# Patient Record
Sex: FEMALE | Race: WHITE | HISPANIC OR LATINO
[De-identification: names, ages, dates, MRNs, and addresses within clinical notes are randomized per-mention and may not be internally consistent; named-entity substitution may affect disease eponyms.]

---

## 2024-04-25 PROBLEM — Z00.00 ENCOUNTER FOR PREVENTIVE HEALTH EXAMINATION: Status: ACTIVE | Noted: 2024-04-25

## 2024-04-29 ENCOUNTER — APPOINTMENT (OUTPATIENT)
Dept: ORTHOPEDIC SURGERY | Facility: CLINIC | Age: 39
End: 2024-04-29
Payer: COMMERCIAL

## 2024-04-29 VITALS — WEIGHT: 135 LBS | BODY MASS INDEX: 25.49 KG/M2 | HEIGHT: 61 IN

## 2024-04-29 DIAGNOSIS — R74.8 ABNORMAL LEVELS OF OTHER SERUM ENZYMES: ICD-10-CM

## 2024-04-29 DIAGNOSIS — M76.891 OTHER SPECIFIED ENTHESOPATHIES OF RIGHT LOWER LIMB, EXCLUDING FOOT: ICD-10-CM

## 2024-04-29 DIAGNOSIS — M25.561 PAIN IN RIGHT KNEE: ICD-10-CM

## 2024-04-29 DIAGNOSIS — Z82.61 FAMILY HISTORY OF ARTHRITIS: ICD-10-CM

## 2024-04-29 PROCEDURE — 73562 X-RAY EXAM OF KNEE 3: CPT | Mod: RT

## 2024-04-29 PROCEDURE — 99203 OFFICE O/P NEW LOW 30 MIN: CPT

## 2024-04-29 RX ORDER — NAPROXEN 500 MG/1
500 TABLET ORAL
Qty: 30 | Refills: 0 | Status: ACTIVE | COMMUNITY
Start: 2024-04-29 | End: 1900-01-01

## 2024-04-29 RX ORDER — OMEPRAZOLE 40 MG/1
CAPSULE, DELAYED RELEASE ORAL
Refills: 0 | Status: ACTIVE | COMMUNITY

## 2024-04-30 NOTE — IMAGING
[Right] : right knee [All Views] : anteroposterior, lateral, skyline, and anteroposterior standing [de-identified] : The patient is a well appearing 38 year  old female of their stated age. Patient ambulates with a normal gait. Negative straight leg raise bilateral.    Effected Knee: RIGHT  ROM:  0-140 degrees Lachman: Negative Pivot Shift: Negative Anterior Drawer: Negative Posterior Drawer / Sag: Negative Varus Stress 0 degrees: Stable Varus Stress 30 degrees: Stable Valgus Stress 0 degrees: Stable Valgus Stress 30 degrees: Stable Medial Emily: Negative Lateral Emily: Negative Patella Glide: 2+ Patella Apprehension: Negative Patella Grind: Negative Pes Wilton Valgus: Negative Pes Cavus: Negative   Palpation: Medial Joint Line: Nontender Lateral Joint Line: Nontender Medial Collateral Ligament: Nontender Lateral Collateral Ligament/PLC: Nontender Distal Femur: Nontender Proximal Tibia: Nontender Tibial Tubercle: Nontender Gerdy's Tubercle: Nontender Distal Pole Patella: Nontender Quadriceps Tendon: TENDER &  Intact Patella Tendon: Nontender &  Intact Medial Femoral Condyle: Nontender Medial Distal Hamstring/PES: Nontender Lateral Distal Hamstring: Nontender & Stable Iliotibial Band: Nontender Medial Patellofemoral Ligament: Nontender Adductor: Nontender Proximal GSC-Plantaris: Nontender Calf: Supple & Nontender   Inspection: [facility]  Deformity: No Erythema: No Ecchymosis: No Abrasions: No Effusion: No Prepatella Bursitis: No Neurologic Exam: Sensation L4-S1: Grossly Intact Motor Exam: Quadriceps: 5 out of 5 Hamstrings: 5 out of 5 EHL: 5 out of 5 FHL: 5 out of 5 TA: 5 out of 5 GS: 5 out of 5 Circulatory/Pulses: Dorsalis Pedis: 2+ Posterior Tibialis: 2+ Additional Pertinent Findings: None   Contralateral Knee:                            ROM: 0-145 degrees Other Pertinent Findings: None   Assessment: The patient is a 38 year old female with right knee pain and radiographic and physical exam findings consistent with quadriceps tendonitis.  The patient's condition is acute. Documents/Results Reviewed Today: X-Ray right knee  Tests/Studies Independently Interpreted Today: X-Ray right knee reveals evidence of previous ACL reconstruction with femoral and tibial button in proper anatomical approximation.  Pertinent findings include: 0-140. -LM/PS/AD, tender quadriceps tendon, +pes planovalgus  Confounding medical conditions/concerns: S/p right knee ACL reconstruction 5/2015   Plan: We discussed treatment options for the patient's quadriceps tendonitis being an overuse injury related to high impact activities. The patient will start physical therapy, HEP, and stretching. Advised the patient to obtain a Reparel knee sleeve and topical Voltaren gel to aid in inflammatory process. She will begin use of full length inserts. Prescribed patient Naproxen 500mg BID x 2 weeks, then PRN for pain management and inflammation - use as directed and take with food. Cautiously optimistic that this will resolve through proper rest, rehab, and stretching. The patient was advised to let pain/discomfort be their guide for return to sport specific activity. Modify activity as discussed.  Tests Ordered: None  Prescription Medications Ordered: Naproxen 500mg and topical Voltaren gel  Braces/DME Ordered: Reparel  Activity/Work/Sports Status: None Additional Instructions: Full length shoe inserts  Follow-Up: 4 weeks   The patient's current medication management of their orthopedic diagnosis was reviewed today: (1) We discussed a comprehensive treatment plan that included possible pharmaceutical management involving the use of prescription strength medications including but not limited to options such as oral Naprosyn 500mg BID, once daily Meloxicam 15 mg, or 500-650 mg Tylenol versus over the counter oral medications and topical prescription NSAID Pennsaid vs over the counter Voltaren gel.  Based on our extensive discussion, the patient was prescribed Naprosyn 500mg BID for two weeks.  It will then be used PRN for pain, inflammation and discomfort. (2) There is a moderate risk of morbidity with further treatment, especially from use of prescription strength medications and possible side effects of these medications which include upset stomach with oral medications, skin reactions to topical medications and cardiac/renal issues with long term use. (3) I recommended that the patient follow-up with their medical physician to discuss any significant specific potential issues with long term medication use such as interactions with current medications or with exacerbation of underlying medical comorbidities. (4) The benefits and risks associated with use of injectable, oral or topical, prescription and over the counter anti-inflammatory medications were discussed with the patient. The patient voiced understanding of the risks including but not limited to bleeding, stroke, kidney dysfunction, heart disease, and were referred to the black box warning label for further information.   I, Marysol Gage attest that this documentation has been prepared under the direction and in the presence of Provider Dr. Dimitri Clements.   The documentation recorded by the scribe accurately reflects the services I, Dr. Dimitri Clements, personally performed and the decisions made by me. [FreeTextEntry9] : previous ACL reconstruction with femoral and tibial button in proper anatomical approximation

## 2024-04-30 NOTE — HISTORY OF PRESENT ILLNESS
[de-identified] : The patient is a 38 year  old R hand dominant female who presents today complaining of R knee pain.   Date of Injury/Onset: 3/30/24 Pain:    At Rest: 2/10  With Activity:  7/10  Mechanism of injury: after working out started experiencing pain no specific JUAN DAVID This is NOT a Work Related Injury being treated under Worker's Compensation. This is NOT an athletic injury occurring associated with an interscholastic or organized sports team. 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 Clements in 05/2015

## 2024-05-23 ENCOUNTER — APPOINTMENT (OUTPATIENT)
Dept: ORTHOPEDIC SURGERY | Facility: CLINIC | Age: 39
End: 2024-05-23

## 2024-07-25 ENCOUNTER — APPOINTMENT (OUTPATIENT)
Dept: ORTHOPEDIC SURGERY | Facility: CLINIC | Age: 39
End: 2024-07-25

## 2024-07-30 ENCOUNTER — RESULT REVIEW (OUTPATIENT)
Age: 39
End: 2024-07-30

## 2024-08-01 ENCOUNTER — APPOINTMENT (OUTPATIENT)
Dept: ORTHOPEDIC SURGERY | Facility: CLINIC | Age: 39
End: 2024-08-01

## 2024-08-01 VITALS — WEIGHT: 135 LBS | HEIGHT: 61 IN | BODY MASS INDEX: 25.49 KG/M2

## 2024-08-01 DIAGNOSIS — M77.01 MEDIAL EPICONDYLITIS, RIGHT ELBOW: ICD-10-CM

## 2024-08-01 DIAGNOSIS — M77.02 MEDIAL EPICONDYLITIS, RIGHT ELBOW: ICD-10-CM

## 2024-08-01 DIAGNOSIS — M25.521 PAIN IN RIGHT ELBOW: ICD-10-CM

## 2024-08-01 DIAGNOSIS — M22.2X1 PATELLOFEMORAL DISORDERS, RIGHT KNEE: ICD-10-CM

## 2024-08-01 DIAGNOSIS — M25.522 PAIN IN LEFT ELBOW: ICD-10-CM

## 2024-08-01 PROCEDURE — 99213 OFFICE O/P EST LOW 20 MIN: CPT

## 2024-08-01 NOTE — HISTORY OF PRESENT ILLNESS
[de-identified] : The patient is a 38 year old right hand dominant female who presents today complaining of b/l elbow pain R>L and R knee pain. Date of Injury/Onset: 6/2024      Pain: At Rest: 2/10 b/l elbow, 1/10 R knee      With Activity: 8/10 b/l elbows, 7/10 R knee Mechanism of injury: pt states she was trying to learn how to do pull-ups before her 39th bday and feels she may have done too much and began feeling pain in her b/l elbows. She has been resting longer between workouts and still not seeing improvements.  This is _ a Work Related Injury being treated under Worker's Compensation. This is _ an athletic injury occurring associated with an interscholastic or organized sports team. 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

## 2024-08-01 NOTE — HISTORY OF PRESENT ILLNESS
[de-identified] : The patient is a 38 year old right hand dominant female who presents today complaining of b/l elbow pain R>L and R knee pain. Date of Injury/Onset: 6/2024      Pain: At Rest: 2/10 b/l elbow, 1/10 R knee      With Activity: 8/10 b/l elbows, 7/10 R knee Mechanism of injury: pt states she was trying to learn how to do pull-ups before her 39th bday and feels she may have done too much and began feeling pain in her b/l elbows. She has been resting longer between workouts and still not seeing improvements.  This is _ a Work Related Injury being treated under Worker's Compensation. This is _ an athletic injury occurring associated with an interscholastic or organized sports team. 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

## 2024-08-01 NOTE — IMAGING
[de-identified] : The patient is a well appearing 38 year old female of their stated age. Patient ambulates with a normal gait. Negative straight leg raise bilateral   Effected Knee: RIGHT ROM:  0-145 degrees Lachman: Negative Pivot Shift: Negative Anterior Drawer: Negative Posterior Drawer / Sag: Negative Varus Stress 0 degrees: Stable Varus Stress 30 degrees: Stable Valgus Stress 0 degrees: Stable Valgus Stress 30 degrees: Stable Medial Emily: Negative Lateral Emily: Negative Patella Glide: 2+ Patella Apprehension: Negative Patella Grind: Negative   Palpation: Medial Joint Line: Nontender Lateral Joint Line: Nontender Medial Collateral Ligament: Nontender Lateral Collateral Ligament/PLC: Nontender Distal Femur: Nontender Proximal Tibia: Nontender Tibial Tubercle: Nontender Distal Pole Patella: Nontender Quadriceps Tendon: TENDER &  Intact Patella Tendon: Nontender &  Intact Medial Femoral Condyle: Nontender Medial Distal Hamstring/PES: Nontender Lateral Distal Hamstring: Nontender & Stable Iliotibial Band: Nontender Medial Patellofemoral Ligament: Nontender Adductor: Nontender Proximal GSC-Plantaris: Nontender Calf: Supple & Nontender   Inspection: Deformity: No Erythema: No Ecchymosis: No Abrasions: No Effusion: No Prepatella Bursitis: No Neurologic Exam: Sensation L4-S1: Grossly Intact Motor Exam: Quadriceps: 5 out of 5 Hamstrings: 5 out of 5 EHL: 5 out of 5 FHL: 5 out of 5 TA: 5 out of 5 GS: 5 out of 5 Circulatory/Pulses: Dorsalis Pedis: 2+ Posterior Tibialis: 2+ Additional Pertinent Findings: None     Contralateral Knee:                          ROM: 0-145 degrees Other Pertinent Findings: None >>>>>>>>>>>>>>>>>>>>>>>>>>>>>>>>>>>>>>>>>>>>>>>>>>>>>>> The patient is a well appearing 38 year old female of their stated age. Neck is supple & nontender to palpation. Negative Spurling's test.     Right Shoulder:    ROM:   Forward Flexion: 180 degrees Abduction: 180 degrees ER at 90: 90 degrees IR at 90: 45 degrees ER at N: 50 degrees Motor: Abduction: 5 out of 5 FPS: 5 out of 5 Flexion: 5 out of 5 Internal Rotation: 5 out of 5 External Rotation: 5 out of 5 Provocative Testing: Impingement: Negative Montgomery's: Negative Other: N/A     Left Shoulder:    ROM: Forward Flexion: 180 degrees Abduction: 180 degrees ER at 90: 90 degrees IR at 90: 45 degrees ER at N: 50 degrees Motor: Abduction: 5 out of 5 FPS: 5 out of 5 Flexion: 5 out of 5 Internal Rotation: 5 out of 5 External Rotation: 5 out of 5 Provocative Testing: Impingement: Negative Montgomery's: Negative Other: N/A  -----------------------------------   Effected Elbow: ROM: Flexion: 0-145 degrees Supination: 90 degrees Pronation: 90 degrees     Inspection: Erythema: None Ecchymosis: None Abrasions: None Effusion: None Deformity: None    Palpation: Crepitus: None Medial Epicondyle/Flexor-Pronator: TENDER BILATERALLY Lateral Epicondyle/ECRB: Nontender Olecranon: Nontender Radial Head: Nontender Humeral Head: Nontender Distal Biceps: Nontender Distal Triceps: Nontender Flexor-Pronator: Nontender Extensor/ECRB: Nontender UCL: Nontender Pronator Intersection: Nontender Ulnar Nerve:  Stable & Nontender    Stress Testing: Varus at 0 Degrees: Stable Varus at 30 Degrees: Stable Valgus at 0 Degrees: Stable Valgus at 30 Degrees: Stable     Motor: Elbow Flexion: 5 out of 5 Elbow Extension: 5 out of 5 Supination: 5 out of 5 Pronation: 5 out of 5 Wrist Flexion: 5 out of 5 Wrist Extension: 5 out of 5 Interossei: 5 out of 5 : 5 out of 5    Provocative Testing: Milking: Negative Moving Valgus Stress: Negative Posterolateral R-I: Negative Hook Test: Negative Resisted Wrist Extension: No pain Resisted Index Finger Extension: No Pain Resisted Middle Finger Extension: No Pain Resisted Wrist Flexion: No Pain Resisted Pronation: No Pain  Neurologic Exam: Axillary Nerve:  SLT Radial Nerve: SLT Median Nerve: SLT Ulnar Nerve:  SLT Other:  N/A   Vascular Exam: Radial Pulse: 2+ Ulnar Pulse: 2+ Capillary Refill: <2 Seconds Other Exams: None Pertinent Contralateral Elbow Findings: None      Assessment: The patient is a 38-year-old female with right knee pain bilateral elbow pain and radiographic and physical exam findings consistent with bilateral medial epicondylitis. quad tendinitis and patellofemoral syndrome. The patient's condition is acute Documents/Results Reviewed Today: MRI right knee Tests/Studies Independently Interpreted Today: MRI right knee reveals evidence of intact ACL graft,  Pertinent findings include: RIGHT KNEE: Tender Quadriceps  BILATERL ELBOWS: Tender medial epicondylitis.  Confounding medical conditions/concerns: None     Plan: Discussed treatment options for the patient's bilateral medial epicondylitis, quadriceps tendinitis and patella femoral syndrome. Patient will start Physical Therapy, HEP and stretching. Discussed appropriate use of OTC anti-inflammatories and analgesics (including but not limited to Aleve, Advil, Tylenol, Motrin, Ibuprofen, Voltaren gel, etc.). Recommended obtaining Reparel sleeve. Tests Ordered: None  Prescription Medications Ordered: Discussed appropriate use of OTC anti-inflammatories and analgesics (including but not limited to Aleve, Advil, Tylenol, Motrin, Ibuprofen, Voltaren gel, etc.) Braces/DME Ordered: Reparel sleeve Activity/Work/Sports Status: As tolerated Additional Instructions: None Follow-Up: 6 weeks   The patient's current medication management of their orthopedic diagnosis was reviewed today: (1) We discussed a comprehensive treatment plan that included possible pharmaceutical management involving the use of prescription strength medications including but not limited to options such as oral Naprosyn 500mg BID, once daily Meloxicam 15 mg, or 500-650 mg Tylenol versus over the counter oral medications and topical prescription NSAID Pennsaid vs over the counter Voltaren gel.  Based on our extensive discussion, the patient declined prescription medication and will use over the counter Advil, Alleve, Voltaren Gel or Tylenol as directed. (2) There is a moderate risk of morbidity with further treatment, especially from use of prescription strength medications and possible side effects of these medications which include upset stomach with oral medications, skin reactions to topical medications and cardiac/renal issues with long term use. (3) I recommended that the patient follow-up with their medical physician to discuss any significant specific potential issues with long term medication use such as interactions with current medications or with exacerbation of underlying medical comorbidities. (4) The benefits and risks associated with use of injectable, oral or topical, prescription and over the counter anti-inflammatory medications were discussed with the patient. The patient voiced understanding of the risks including but not limited to bleeding, stroke, kidney dysfunction, heart disease, and were referred to the black box warning label for further information.  IMelissa attest that this documentation has been prepared under the direction and in the presence of Provider Dr. Dimitri Clements.  The documentation recorded by the scribe accurately reflects the services IDr. Dimitri, personally performed and the decisions made by me.

## 2024-08-01 NOTE — IMAGING
[de-identified] : The patient is a well appearing 38 year old female of their stated age. Patient ambulates with a normal gait. Negative straight leg raise bilateral   Effected Knee: RIGHT ROM:  0-145 degrees Lachman: Negative Pivot Shift: Negative Anterior Drawer: Negative Posterior Drawer / Sag: Negative Varus Stress 0 degrees: Stable Varus Stress 30 degrees: Stable Valgus Stress 0 degrees: Stable Valgus Stress 30 degrees: Stable Medial Emily: Negative Lateral Emily: Negative Patella Glide: 2+ Patella Apprehension: Negative Patella Grind: Negative   Palpation: Medial Joint Line: Nontender Lateral Joint Line: Nontender Medial Collateral Ligament: Nontender Lateral Collateral Ligament/PLC: Nontender Distal Femur: Nontender Proximal Tibia: Nontender Tibial Tubercle: Nontender Distal Pole Patella: Nontender Quadriceps Tendon: TENDER &  Intact Patella Tendon: Nontender &  Intact Medial Femoral Condyle: Nontender Medial Distal Hamstring/PES: Nontender Lateral Distal Hamstring: Nontender & Stable Iliotibial Band: Nontender Medial Patellofemoral Ligament: Nontender Adductor: Nontender Proximal GSC-Plantaris: Nontender Calf: Supple & Nontender   Inspection: Deformity: No Erythema: No Ecchymosis: No Abrasions: No Effusion: No Prepatella Bursitis: No Neurologic Exam: Sensation L4-S1: Grossly Intact Motor Exam: Quadriceps: 5 out of 5 Hamstrings: 5 out of 5 EHL: 5 out of 5 FHL: 5 out of 5 TA: 5 out of 5 GS: 5 out of 5 Circulatory/Pulses: Dorsalis Pedis: 2+ Posterior Tibialis: 2+ Additional Pertinent Findings: None     Contralateral Knee:                          ROM: 0-145 degrees Other Pertinent Findings: None >>>>>>>>>>>>>>>>>>>>>>>>>>>>>>>>>>>>>>>>>>>>>>>>>>>>>>> The patient is a well appearing 38 year old female of their stated age. Neck is supple & nontender to palpation. Negative Spurling's test.     Right Shoulder:    ROM:   Forward Flexion: 180 degrees Abduction: 180 degrees ER at 90: 90 degrees IR at 90: 45 degrees ER at N: 50 degrees Motor: Abduction: 5 out of 5 FPS: 5 out of 5 Flexion: 5 out of 5 Internal Rotation: 5 out of 5 External Rotation: 5 out of 5 Provocative Testing: Impingement: Negative Wilkinson's: Negative Other: N/A     Left Shoulder:    ROM: Forward Flexion: 180 degrees Abduction: 180 degrees ER at 90: 90 degrees IR at 90: 45 degrees ER at N: 50 degrees Motor: Abduction: 5 out of 5 FPS: 5 out of 5 Flexion: 5 out of 5 Internal Rotation: 5 out of 5 External Rotation: 5 out of 5 Provocative Testing: Impingement: Negative Wilkinson's: Negative Other: N/A  -----------------------------------   Effected Elbow: ROM: Flexion: 0-145 degrees Supination: 90 degrees Pronation: 90 degrees     Inspection: Erythema: None Ecchymosis: None Abrasions: None Effusion: None Deformity: None    Palpation: Crepitus: None Medial Epicondyle/Flexor-Pronator: TENDER BILATERALLY Lateral Epicondyle/ECRB: Nontender Olecranon: Nontender Radial Head: Nontender Humeral Head: Nontender Distal Biceps: Nontender Distal Triceps: Nontender Flexor-Pronator: Nontender Extensor/ECRB: Nontender UCL: Nontender Pronator Intersection: Nontender Ulnar Nerve:  Stable & Nontender    Stress Testing: Varus at 0 Degrees: Stable Varus at 30 Degrees: Stable Valgus at 0 Degrees: Stable Valgus at 30 Degrees: Stable     Motor: Elbow Flexion: 5 out of 5 Elbow Extension: 5 out of 5 Supination: 5 out of 5 Pronation: 5 out of 5 Wrist Flexion: 5 out of 5 Wrist Extension: 5 out of 5 Interossei: 5 out of 5 : 5 out of 5    Provocative Testing: Milking: Negative Moving Valgus Stress: Negative Posterolateral R-I: Negative Hook Test: Negative Resisted Wrist Extension: No pain Resisted Index Finger Extension: No Pain Resisted Middle Finger Extension: No Pain Resisted Wrist Flexion: No Pain Resisted Pronation: No Pain  Neurologic Exam: Axillary Nerve:  SLT Radial Nerve: SLT Median Nerve: SLT Ulnar Nerve:  SLT Other:  N/A   Vascular Exam: Radial Pulse: 2+ Ulnar Pulse: 2+ Capillary Refill: <2 Seconds Other Exams: None Pertinent Contralateral Elbow Findings: None      Assessment: The patient is a 38-year-old female with right knee pain bilateral elbow pain and radiographic and physical exam findings consistent with bilateral medial epicondylitis. quad tendinitis and patellofemoral syndrome. The patient's condition is acute Documents/Results Reviewed Today: MRI right knee Tests/Studies Independently Interpreted Today: MRI right knee reveals evidence of intact ACL graft,  Pertinent findings include: RIGHT KNEE: Tender Quadriceps  BILATERL ELBOWS: Tender medial epicondylitis.  Confounding medical conditions/concerns: None     Plan: Discussed treatment options for the patient's bilateral medial epicondylitis, quadriceps tendinitis and patella femoral syndrome. Patient will start Physical Therapy, HEP and stretching. Discussed appropriate use of OTC anti-inflammatories and analgesics (including but not limited to Aleve, Advil, Tylenol, Motrin, Ibuprofen, Voltaren gel, etc.). Recommended obtaining Reparel sleeve. Tests Ordered: None  Prescription Medications Ordered: Discussed appropriate use of OTC anti-inflammatories and analgesics (including but not limited to Aleve, Advil, Tylenol, Motrin, Ibuprofen, Voltaren gel, etc.) Braces/DME Ordered: Reparel sleeve Activity/Work/Sports Status: As tolerated Additional Instructions: None Follow-Up: 6 weeks   The patient's current medication management of their orthopedic diagnosis was reviewed today: (1) We discussed a comprehensive treatment plan that included possible pharmaceutical management involving the use of prescription strength medications including but not limited to options such as oral Naprosyn 500mg BID, once daily Meloxicam 15 mg, or 500-650 mg Tylenol versus over the counter oral medications and topical prescription NSAID Pennsaid vs over the counter Voltaren gel.  Based on our extensive discussion, the patient declined prescription medication and will use over the counter Advil, Alleve, Voltaren Gel or Tylenol as directed. (2) There is a moderate risk of morbidity with further treatment, especially from use of prescription strength medications and possible side effects of these medications which include upset stomach with oral medications, skin reactions to topical medications and cardiac/renal issues with long term use. (3) I recommended that the patient follow-up with their medical physician to discuss any significant specific potential issues with long term medication use such as interactions with current medications or with exacerbation of underlying medical comorbidities. (4) The benefits and risks associated with use of injectable, oral or topical, prescription and over the counter anti-inflammatory medications were discussed with the patient. The patient voiced understanding of the risks including but not limited to bleeding, stroke, kidney dysfunction, heart disease, and were referred to the black box warning label for further information.  IMelissa attest that this documentation has been prepared under the direction and in the presence of Provider Dr. Dimitri Clements.  The documentation recorded by the scribe accurately reflects the services IDr. Dimitri, personally performed and the decisions made by me.

## 2024-08-01 NOTE — DATA REVIEWED
[MRI] : MRI [Right] : of the right [Knee] : knee [Report was reviewed and noted in the chart] : The report was reviewed and noted in the chart [I independently reviewed and interpreted images and report] : I independently reviewed and interpreted images and report [I reviewed the films/CD and additionally noted] : I reviewed the films/CD and additionally noted [FreeTextEntry1] : MRI right knee reveals evidence of intact ACL graft,

## 2024-09-05 ENCOUNTER — APPOINTMENT (OUTPATIENT)
Dept: ORTHOPEDIC SURGERY | Facility: CLINIC | Age: 39
End: 2024-09-05
Payer: COMMERCIAL

## 2024-09-05 VITALS — WEIGHT: 135 LBS | BODY MASS INDEX: 25.49 KG/M2 | HEIGHT: 61 IN

## 2024-09-05 DIAGNOSIS — M25.521 PAIN IN RIGHT ELBOW: ICD-10-CM

## 2024-09-05 DIAGNOSIS — M77.02 MEDIAL EPICONDYLITIS, RIGHT ELBOW: ICD-10-CM

## 2024-09-05 DIAGNOSIS — M77.01 MEDIAL EPICONDYLITIS, RIGHT ELBOW: ICD-10-CM

## 2024-09-05 DIAGNOSIS — M25.522 PAIN IN LEFT ELBOW: ICD-10-CM

## 2024-09-05 DIAGNOSIS — M25.561 PAIN IN RIGHT KNEE: ICD-10-CM

## 2024-09-05 DIAGNOSIS — M76.31 ILIOTIBIAL BAND SYNDROME, RIGHT LEG: ICD-10-CM

## 2024-09-05 PROCEDURE — 99213 OFFICE O/P EST LOW 20 MIN: CPT

## 2024-09-06 PROBLEM — M76.31 ILIOTIBIAL BAND SYNDROME OF RIGHT SIDE: Status: ACTIVE | Noted: 2024-09-05

## 2024-09-06 NOTE — HISTORY OF PRESENT ILLNESS
[de-identified] : The patient is a 38 year old right hand dominant female who presents today complaining of b/l elbow pain R>L and R knee pain. Date of Injury/Onset: 6/2024      Pain: At Rest: 2/10 b/l elbow, 0/10 R knee      With Activity: 8/10 b/l elbows, 3/10 R knee Mechanism of injury: pt states she was trying to learn how to do pull-ups before her 39th bday and feels she may have done too much and began feeling pain in her b/l elbows. She has been resting longer between workouts and still not seeing improvements.  This is NOT a Work Related Injury being treated under Worker's Compensation. This is NOT an athletic injury occurring associated with an interscholastic or organized sports team. 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 She is feeling better and will start PT next week. 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 She did go hiking while on vacation and wore her R knee brace, she states it was helpful, but has otherwise not worn the knee brace.  Additional Information: None

## 2024-09-06 NOTE — IMAGING
[de-identified] : The patient is a well appearing 38 year old female of their stated age. Patient ambulates with a normal gait. Negative straight leg raise bilateral   Effected Knee: RIGHT ROM:  0-140 degrees Lachman: Negative Pivot Shift: Negative Anterior Drawer: Negative Posterior Drawer / Sag: Negative Varus Stress 0 degrees: Stable Varus Stress 30 degrees: Stable Valgus Stress 0 degrees: Stable Valgus Stress 30 degrees: Stable Medial Emily: Negative Lateral Emily: Negative Patella Glide: 2+ Patella Apprehension: Negative Patella Grind: Negative   Palpation: Medial Joint Line: Nontender Lateral Joint Line: Nontender Medial Collateral Ligament: Nontender Lateral Collateral Ligament/PLC: Nontender Distal Femur: Nontender Proximal Tibia: Nontender Tibial Tubercle: Nontender Distal Pole Patella: Nontender Quadriceps Tendon: TENDER &  Intact Patella Tendon: Nontender &  Intact Medial Femoral Condyle: Nontender Medial Distal Hamstring/PES: Nontender Lateral Distal Hamstring: Nontender & Stable Iliotibial Band: TENDER  Medial Patellofemoral Ligament: Nontender Adductor: Nontender Proximal GSC-Plantaris: Nontender Calf: Supple & Nontender   Inspection: MULTIPLE WELL HEALED SURGIAL INCISIONS  Deformity: No Erythema: No Ecchymosis: No Abrasions: No Effusion: No Prepatella Bursitis: No Neurologic Exam: Sensation L4-S1: Grossly Intact Motor Exam: Quadriceps: 5 out of 5 Hamstrings: 5 out of 5 EHL: 5 out of 5 FHL: 5 out of 5 TA: 5 out of 5 GS: 5 out of 5 Circulatory/Pulses: Dorsalis Pedis: 2+ Posterior Tibialis: 2+ Additional Pertinent Findings: None     Contralateral Knee:                          ROM: 0-145 degrees Other Pertinent Findings: None >>>>>>>>>>>>>>>>>>>>>>>>>>>>>>>>>>>>>>>>>>>>>>>>>>>>>>> Effected Elbow: BILATERAL  ROM: Flexion: 0-145 degrees Supination: 90 degrees Pronation: 90 degrees     Inspection: Erythema: None Ecchymosis: None Abrasions: None Effusion: None Deformity: None    Palpation: Crepitus: None Medial Epicondyle/Flexor-Pronator: TENDER R>L  Lateral Epicondyle/ECRB: Nontender Olecranon: Nontender Radial Head: Nontender Humeral Head: Nontender Distal Biceps: Nontender Distal Triceps: Nontender Flexor-Pronator: Nontender Extensor/ECRB: Nontender UCL: Nontender Pronator Intersection: Nontender Ulnar Nerve:  Stable & Nontender    Stress Testing: Varus at 0 Degrees: Stable Varus at 30 Degrees: Stable Valgus at 0 Degrees: Stable Valgus at 30 Degrees: Stable     Motor: Elbow Flexion: 5 out of 5 Elbow Extension: 5 out of 5 Supination: 5 out of 5 Pronation: 5 out of 5 Wrist Flexion: 5 out of 5 Wrist Extension: 5 out of 5 Interossei: 5 out of 5 : 5 out of 5    Provocative Testing: Milking: Negative Moving Valgus Stress: Negative Posterolateral R-I: Negative Hook Test: Negative Resisted Wrist Extension: No pain Resisted Index Finger Extension: No Pain Resisted Middle Finger Extension: No Pain Resisted Wrist Flexion: No Pain Resisted Pronation: No Pain  Neurologic Exam: Axillary Nerve:  SLT Radial Nerve: SLT Median Nerve: SLT Ulnar Nerve:  SLT Other:  N/A   Vascular Exam: Radial Pulse: 2+ Ulnar Pulse: 2+ Capillary Refill: <2 Seconds Other Exams: None Pertinent Contralateral Elbow Findings: None      Assessment: The patient is a 38-year-old female with right knee pain bilateral elbow pain (R>L) and radiographic and physical exam findings consistent with bilateral medial epicondylitis. iliotibial band syndrome. The patient's condition is acute Documents/Results Reviewed Today: None  Tests/Studies Independently Interpreted Today: None   Pertinent findings include: RIGHT KNEE: 0-140, well healed surgical incisions, tender ITB, -LM/PS/AD. BILATERL ELBOWS: Tender medial epicondyle.  Confounding medical conditions/concerns: None   Plan: advised the patient that her clinical examination is consistent with persistent right medial epicondylitis and right knee ITB syndrome. Recommended she get back into physical therapy for her elbows and knee. Continue to utilize counter force strap for activities of daily living, Reparel knee sleeve, and topical Voltaren gel. We are not concerned for a lateral meniscus tear upon a second review of radiographic images. Discussed appropriate use of OTC anti-inflammatories as needed for pain, inflammation, and discomfort - use as directed and take with food.  Tests Ordered: None  Prescription Medications Ordered: Discussed appropriate use of OTC anti-inflammatories and analgesics (including but not limited to Aleve, Advil, Tylenol, Motrin, Ibuprofen, Voltaren gel, etc.) Braces/DME Ordered: Reparel sleeve and counter force strap  Activity/Work/Sports Status: As tolerated Additional Instructions: topical voltaren gel, limit dumbbells  Follow-Up: 6 weeks  The patient's current medication management of their orthopedic diagnosis was reviewed today: (1) We discussed a comprehensive treatment plan that included possible pharmaceutical management involving the use of prescription strength medications including but not limited to options such as oral Naprosyn 500mg BID, once daily Meloxicam 15 mg, or 500-650 mg Tylenol versus over the counter oral medications and topical prescription NSAID Pennsaid vs over the counter Voltaren gel.  Based on our extensive discussion, the patient declined prescription medication and will use over the counter Advil, Alleve, Voltaren Gel or Tylenol as directed. (2) There is a moderate risk of morbidity with further treatment, especially from use of prescription strength medications and possible side effects of these medications which include upset stomach with oral medications, skin reactions to topical medications and cardiac/renal issues with long term use. (3) I recommended that the patient follow-up with their medical physician to discuss any significant specific potential issues with long term medication use such as interactions with current medications or with exacerbation of underlying medical comorbidities. (4) The benefits and risks associated with use of injectable, oral or topical, prescription and over the counter anti-inflammatory medications were discussed with the patient. The patient voiced understanding of the risks including but not limited to bleeding, stroke, kidney dysfunction, heart disease, and were referred to the black box warning label for further information.  Marysol MARTINEZ attest that this documentation has been prepared under the direction and in the presence of Provider Dr. Dimitri Clements.   The documentation recorded by the scribe accurately reflects the service JUAN Hammer PA-C personally performed and the decisions made by me. The patient was seen by me under the direct supervision of Dr. Dimitri Clements

## 2024-09-06 NOTE — IMAGING
[de-identified] : The patient is a well appearing 38 year old female of their stated age. Patient ambulates with a normal gait. Negative straight leg raise bilateral   Effected Knee: RIGHT ROM:  0-140 degrees Lachman: Negative Pivot Shift: Negative Anterior Drawer: Negative Posterior Drawer / Sag: Negative Varus Stress 0 degrees: Stable Varus Stress 30 degrees: Stable Valgus Stress 0 degrees: Stable Valgus Stress 30 degrees: Stable Medial Emily: Negative Lateral Emily: Negative Patella Glide: 2+ Patella Apprehension: Negative Patella Grind: Negative   Palpation: Medial Joint Line: Nontender Lateral Joint Line: Nontender Medial Collateral Ligament: Nontender Lateral Collateral Ligament/PLC: Nontender Distal Femur: Nontender Proximal Tibia: Nontender Tibial Tubercle: Nontender Distal Pole Patella: Nontender Quadriceps Tendon: TENDER &  Intact Patella Tendon: Nontender &  Intact Medial Femoral Condyle: Nontender Medial Distal Hamstring/PES: Nontender Lateral Distal Hamstring: Nontender & Stable Iliotibial Band: TENDER  Medial Patellofemoral Ligament: Nontender Adductor: Nontender Proximal GSC-Plantaris: Nontender Calf: Supple & Nontender   Inspection: MULTIPLE WELL HEALED SURGIAL INCISIONS  Deformity: No Erythema: No Ecchymosis: No Abrasions: No Effusion: No Prepatella Bursitis: No Neurologic Exam: Sensation L4-S1: Grossly Intact Motor Exam: Quadriceps: 5 out of 5 Hamstrings: 5 out of 5 EHL: 5 out of 5 FHL: 5 out of 5 TA: 5 out of 5 GS: 5 out of 5 Circulatory/Pulses: Dorsalis Pedis: 2+ Posterior Tibialis: 2+ Additional Pertinent Findings: None     Contralateral Knee:                          ROM: 0-145 degrees Other Pertinent Findings: None >>>>>>>>>>>>>>>>>>>>>>>>>>>>>>>>>>>>>>>>>>>>>>>>>>>>>>> Effected Elbow: BILATERAL  ROM: Flexion: 0-145 degrees Supination: 90 degrees Pronation: 90 degrees     Inspection: Erythema: None Ecchymosis: None Abrasions: None Effusion: None Deformity: None    Palpation: Crepitus: None Medial Epicondyle/Flexor-Pronator: TENDER R>L  Lateral Epicondyle/ECRB: Nontender Olecranon: Nontender Radial Head: Nontender Humeral Head: Nontender Distal Biceps: Nontender Distal Triceps: Nontender Flexor-Pronator: Nontender Extensor/ECRB: Nontender UCL: Nontender Pronator Intersection: Nontender Ulnar Nerve:  Stable & Nontender    Stress Testing: Varus at 0 Degrees: Stable Varus at 30 Degrees: Stable Valgus at 0 Degrees: Stable Valgus at 30 Degrees: Stable     Motor: Elbow Flexion: 5 out of 5 Elbow Extension: 5 out of 5 Supination: 5 out of 5 Pronation: 5 out of 5 Wrist Flexion: 5 out of 5 Wrist Extension: 5 out of 5 Interossei: 5 out of 5 : 5 out of 5    Provocative Testing: Milking: Negative Moving Valgus Stress: Negative Posterolateral R-I: Negative Hook Test: Negative Resisted Wrist Extension: No pain Resisted Index Finger Extension: No Pain Resisted Middle Finger Extension: No Pain Resisted Wrist Flexion: No Pain Resisted Pronation: No Pain  Neurologic Exam: Axillary Nerve:  SLT Radial Nerve: SLT Median Nerve: SLT Ulnar Nerve:  SLT Other:  N/A   Vascular Exam: Radial Pulse: 2+ Ulnar Pulse: 2+ Capillary Refill: <2 Seconds Other Exams: None Pertinent Contralateral Elbow Findings: None      Assessment: The patient is a 38-year-old female with right knee pain bilateral elbow pain (R>L) and radiographic and physical exam findings consistent with bilateral medial epicondylitis. iliotibial band syndrome. The patient's condition is acute Documents/Results Reviewed Today: None  Tests/Studies Independently Interpreted Today: None   Pertinent findings include: RIGHT KNEE: 0-140, well healed surgical incisions, tender ITB, -LM/PS/AD. BILATERL ELBOWS: Tender medial epicondyle.  Confounding medical conditions/concerns: None   Plan: advised the patient that her clinical examination is consistent with persistent right medial epicondylitis and right knee ITB syndrome. Recommended she get back into physical therapy for her elbows and knee. Continue to utilize counter force strap for activities of daily living, Reparel knee sleeve, and topical Voltaren gel. We are not concerned for a lateral meniscus tear upon a second review of radiographic images. Discussed appropriate use of OTC anti-inflammatories as needed for pain, inflammation, and discomfort - use as directed and take with food.  Tests Ordered: None  Prescription Medications Ordered: Discussed appropriate use of OTC anti-inflammatories and analgesics (including but not limited to Aleve, Advil, Tylenol, Motrin, Ibuprofen, Voltaren gel, etc.) Braces/DME Ordered: Reparel sleeve and counter force strap  Activity/Work/Sports Status: As tolerated Additional Instructions: topical voltaren gel, limit dumbbells  Follow-Up: 6 weeks  The patient's current medication management of their orthopedic diagnosis was reviewed today: (1) We discussed a comprehensive treatment plan that included possible pharmaceutical management involving the use of prescription strength medications including but not limited to options such as oral Naprosyn 500mg BID, once daily Meloxicam 15 mg, or 500-650 mg Tylenol versus over the counter oral medications and topical prescription NSAID Pennsaid vs over the counter Voltaren gel.  Based on our extensive discussion, the patient declined prescription medication and will use over the counter Advil, Alleve, Voltaren Gel or Tylenol as directed. (2) There is a moderate risk of morbidity with further treatment, especially from use of prescription strength medications and possible side effects of these medications which include upset stomach with oral medications, skin reactions to topical medications and cardiac/renal issues with long term use. (3) I recommended that the patient follow-up with their medical physician to discuss any significant specific potential issues with long term medication use such as interactions with current medications or with exacerbation of underlying medical comorbidities. (4) The benefits and risks associated with use of injectable, oral or topical, prescription and over the counter anti-inflammatory medications were discussed with the patient. The patient voiced understanding of the risks including but not limited to bleeding, stroke, kidney dysfunction, heart disease, and were referred to the black box warning label for further information.  Marysol MARTINEZ attest that this documentation has been prepared under the direction and in the presence of Provider Dr. Dimitri Clements.   The documentation recorded by the scribe accurately reflects the service JUAN Hammer PA-C personally performed and the decisions made by me. The patient was seen by me under the direct supervision of Dr. Dimitri Clements

## 2024-09-06 NOTE — HISTORY OF PRESENT ILLNESS
[de-identified] : The patient is a 38 year old right hand dominant female who presents today complaining of b/l elbow pain R>L and R knee pain. Date of Injury/Onset: 6/2024      Pain: At Rest: 2/10 b/l elbow, 0/10 R knee      With Activity: 8/10 b/l elbows, 3/10 R knee Mechanism of injury: pt states she was trying to learn how to do pull-ups before her 39th bday and feels she may have done too much and began feeling pain in her b/l elbows. She has been resting longer between workouts and still not seeing improvements.  This is NOT a Work Related Injury being treated under Worker's Compensation. This is NOT an athletic injury occurring associated with an interscholastic or organized sports team. 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 She is feeling better and will start PT next week. 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 She did go hiking while on vacation and wore her R knee brace, she states it was helpful, but has otherwise not worn the knee brace.  Additional Information: None

## 2024-10-24 ENCOUNTER — APPOINTMENT (OUTPATIENT)
Dept: ORTHOPEDIC SURGERY | Facility: CLINIC | Age: 39
End: 2024-10-24